# Patient Record
Sex: FEMALE | Race: ASIAN | NOT HISPANIC OR LATINO | Employment: FULL TIME | URBAN - METROPOLITAN AREA
[De-identification: names, ages, dates, MRNs, and addresses within clinical notes are randomized per-mention and may not be internally consistent; named-entity substitution may affect disease eponyms.]

---

## 2019-09-09 ENCOUNTER — TRANSCRIBE ORDERS (OUTPATIENT)
Dept: LAB | Facility: HOSPITAL | Age: 39
End: 2019-09-09

## 2019-09-09 ENCOUNTER — LAB (OUTPATIENT)
Dept: LAB | Facility: HOSPITAL | Age: 39
End: 2019-09-09

## 2019-09-09 DIAGNOSIS — Z32.01 PREGNANCY EXAMINATION OR TEST, POSITIVE RESULT: ICD-10-CM

## 2019-09-09 DIAGNOSIS — Z32.01 PREGNANCY EXAMINATION OR TEST, POSITIVE RESULT: Primary | ICD-10-CM

## 2019-09-09 LAB
PROGEST SERPL-MCNC: 24.1 NG/ML
T4 FREE SERPL-MCNC: 1.45 NG/DL (ref 0.93–1.7)
TSH SERPL DL<=0.05 MIU/L-ACNC: 0.94 UIU/ML (ref 0.27–4.2)

## 2019-09-09 PROCEDURE — 84443 ASSAY THYROID STIM HORMONE: CPT

## 2019-09-09 PROCEDURE — 84702 CHORIONIC GONADOTROPIN TEST: CPT

## 2019-09-09 PROCEDURE — 36415 COLL VENOUS BLD VENIPUNCTURE: CPT

## 2019-09-09 PROCEDURE — 84144 ASSAY OF PROGESTERONE: CPT

## 2019-09-09 PROCEDURE — 84439 ASSAY OF FREE THYROXINE: CPT

## 2019-09-10 LAB — HCG INTACT+B SERPL-ACNC: NORMAL MIU/ML

## 2019-10-22 ENCOUNTER — LAB REQUISITION (OUTPATIENT)
Dept: LAB | Facility: HOSPITAL | Age: 39
End: 2019-10-22

## 2019-10-22 ENCOUNTER — LAB (OUTPATIENT)
Dept: LAB | Facility: HOSPITAL | Age: 39
End: 2019-10-22

## 2019-10-22 DIAGNOSIS — Z00.00 ROUTINE GENERAL MEDICAL EXAMINATION AT A HEALTH CARE FACILITY: ICD-10-CM

## 2019-10-22 DIAGNOSIS — Z34.81 PRENATAL CARE, SUBSEQUENT PREGNANCY, FIRST TRIMESTER: Primary | ICD-10-CM

## 2019-10-22 LAB
ABO GROUP BLD: NORMAL
BLD GP AB SCN SERPL QL: NEGATIVE
GLUCOSE BLD-MCNC: 127 MG/DL (ref 65–99)
HBV SURFACE AG SERPL QL IA: NORMAL
HBV SURFACE AG SERPL QL IA: NORMAL
HCV AB SER DONR QL: NORMAL
HIV1+2 AB SER QL: NORMAL
RH BLD: POSITIVE

## 2019-10-22 PROCEDURE — 82947 ASSAY GLUCOSE BLOOD QUANT: CPT

## 2019-10-22 PROCEDURE — 87340 HEPATITIS B SURFACE AG IA: CPT

## 2019-10-22 PROCEDURE — 86803 HEPATITIS C AB TEST: CPT

## 2019-10-22 PROCEDURE — 86901 BLOOD TYPING SEROLOGIC RH(D): CPT

## 2019-10-22 PROCEDURE — 86900 BLOOD TYPING SEROLOGIC ABO: CPT

## 2019-10-22 PROCEDURE — 80081 OBSTETRIC PANEL INC HIV TSTG: CPT

## 2019-10-22 PROCEDURE — 36415 COLL VENOUS BLD VENIPUNCTURE: CPT

## 2019-10-22 PROCEDURE — 86850 RBC ANTIBODY SCREEN: CPT

## 2019-10-23 LAB
BASOPHILS # BLD AUTO: 0.03 10*3/MM3 (ref 0–0.2)
BASOPHILS NFR BLD AUTO: 0.3 % (ref 0–1.5)
DEPRECATED RDW RBC AUTO: 41 FL (ref 37–54)
EOSINOPHIL # BLD AUTO: 0.09 10*3/MM3 (ref 0–0.4)
EOSINOPHIL NFR BLD AUTO: 1 % (ref 0.3–6.2)
ERYTHROCYTE [DISTWIDTH] IN BLOOD BY AUTOMATED COUNT: 12.5 % (ref 12.3–15.4)
HCT VFR BLD AUTO: 32.4 % (ref 34–46.6)
HGB BLD-MCNC: 11.1 G/DL (ref 12–15.9)
IMM GRANULOCYTES # BLD AUTO: 0.04 10*3/MM3 (ref 0–0.05)
IMM GRANULOCYTES NFR BLD AUTO: 0.4 % (ref 0–0.5)
LYMPHOCYTES # BLD AUTO: 2.75 10*3/MM3 (ref 0.7–3.1)
LYMPHOCYTES NFR BLD AUTO: 29.2 % (ref 19.6–45.3)
MCH RBC QN AUTO: 31.1 PG (ref 26.6–33)
MCHC RBC AUTO-ENTMCNC: 34.3 G/DL (ref 31.5–35.7)
MCV RBC AUTO: 90.8 FL (ref 79–97)
MONOCYTES # BLD AUTO: 0.48 10*3/MM3 (ref 0.1–0.9)
MONOCYTES NFR BLD AUTO: 5.1 % (ref 5–12)
NEUTROPHILS # BLD AUTO: 6.03 10*3/MM3 (ref 1.7–7)
NEUTROPHILS NFR BLD AUTO: 64 % (ref 42.7–76)
NRBC BLD AUTO-RTO: 0 /100 WBC (ref 0–0.2)
PLATELET # BLD AUTO: 292 10*3/MM3 (ref 140–450)
PMV BLD AUTO: 9.8 FL (ref 6–12)
RBC # BLD AUTO: 3.57 10*6/MM3 (ref 3.77–5.28)
RPR SER QL: NORMAL
RUBV IGG SERPL IA-ACNC: NORMAL
WBC NRBC COR # BLD: 9.42 10*3/MM3 (ref 3.4–10.8)

## 2020-02-11 ENCOUNTER — LAB (OUTPATIENT)
Dept: LAB | Facility: HOSPITAL | Age: 40
End: 2020-02-11

## 2020-02-11 ENCOUNTER — TRANSCRIBE ORDERS (OUTPATIENT)
Dept: LAB | Facility: HOSPITAL | Age: 40
End: 2020-02-11

## 2020-02-11 DIAGNOSIS — Z34.83 PRENATAL CARE, SUBSEQUENT PREGNANCY, THIRD TRIMESTER: Primary | ICD-10-CM

## 2020-02-11 DIAGNOSIS — Z34.83 PRENATAL CARE, SUBSEQUENT PREGNANCY, THIRD TRIMESTER: ICD-10-CM

## 2020-02-11 DIAGNOSIS — Z3A.28 28 WEEKS GESTATION OF PREGNANCY: ICD-10-CM

## 2020-02-11 LAB
BLD GP AB SCN SERPL QL: NEGATIVE
DEPRECATED RDW RBC AUTO: 40.2 FL (ref 37–54)
ERYTHROCYTE [DISTWIDTH] IN BLOOD BY AUTOMATED COUNT: 11.9 % (ref 12.3–15.4)
GLUCOSE 1H P 100 G GLC PO SERPL-MCNC: 89 MG/DL (ref 65–140)
HCT VFR BLD AUTO: 32.5 % (ref 34–46.6)
HGB BLD-MCNC: 11.1 G/DL (ref 12–15.9)
MCH RBC QN AUTO: 31.9 PG (ref 26.6–33)
MCHC RBC AUTO-ENTMCNC: 34.2 G/DL (ref 31.5–35.7)
MCV RBC AUTO: 93.4 FL (ref 79–97)
PLATELET # BLD AUTO: 260 10*3/MM3 (ref 140–450)
PMV BLD AUTO: 9.6 FL (ref 6–12)
RBC # BLD AUTO: 3.48 10*6/MM3 (ref 3.77–5.28)
WBC NRBC COR # BLD: 7.85 10*3/MM3 (ref 3.4–10.8)

## 2020-02-11 PROCEDURE — 86850 RBC ANTIBODY SCREEN: CPT

## 2020-02-11 PROCEDURE — 82950 GLUCOSE TEST: CPT

## 2020-02-11 PROCEDURE — 85027 COMPLETE CBC AUTOMATED: CPT

## 2020-02-11 PROCEDURE — 36415 COLL VENOUS BLD VENIPUNCTURE: CPT

## 2020-04-08 LAB — EXTERNAL GROUP B STREP ANTIGEN: NORMAL

## 2020-04-22 ENCOUNTER — HOSPITAL ENCOUNTER (INPATIENT)
Facility: HOSPITAL | Age: 40
LOS: 2 days | Discharge: HOME | End: 2020-04-24
Attending: OBSTETRICS & GYNECOLOGY | Admitting: OBSTETRICS & GYNECOLOGY

## 2020-04-22 ENCOUNTER — ANESTHESIA EVENT (OUTPATIENT)
Dept: LABOR AND DELIVERY | Facility: HOSPITAL | Age: 40
End: 2020-04-22

## 2020-04-22 ENCOUNTER — ANESTHESIA (OUTPATIENT)
Dept: LABOR AND DELIVERY | Facility: HOSPITAL | Age: 40
End: 2020-04-22

## 2020-04-22 LAB
ABO GROUP BLD: NORMAL
AMPHET+METHAMPHET UR QL: NEGATIVE
AMPHETAMINES UR QL: NEGATIVE
BARBITURATES UR QL SCN: NEGATIVE
BENZODIAZ UR QL SCN: NEGATIVE
BLD GP AB SCN SERPL QL: NEGATIVE
BUPRENORPHINE SERPL-MCNC: NEGATIVE NG/ML
CANNABINOIDS SERPL QL: NEGATIVE
COCAINE UR QL: NEGATIVE
DEPRECATED RDW RBC AUTO: 47.8 FL (ref 37–54)
ERYTHROCYTE [DISTWIDTH] IN BLOOD BY AUTOMATED COUNT: 13.2 % (ref 12.3–15.4)
HCT VFR BLD AUTO: 38.1 % (ref 34–46.6)
HGB BLD-MCNC: 12.2 G/DL (ref 12–15.9)
MCH RBC QN AUTO: 31 PG (ref 26.6–33)
MCHC RBC AUTO-ENTMCNC: 32 G/DL (ref 31.5–35.7)
MCV RBC AUTO: 96.9 FL (ref 79–97)
METHADONE UR QL SCN: NEGATIVE
OPIATES UR QL: NEGATIVE
OXYCODONE UR QL SCN: NEGATIVE
PCP UR QL SCN: NEGATIVE
PLATELET # BLD AUTO: 213 10*3/MM3 (ref 140–450)
PMV BLD AUTO: 9.8 FL (ref 6–12)
PROPOXYPH UR QL: NEGATIVE
RBC # BLD AUTO: 3.93 10*6/MM3 (ref 3.77–5.28)
RH BLD: POSITIVE
T&S EXPIRATION DATE: NORMAL
TRICYCLICS UR QL SCN: NEGATIVE
WBC NRBC COR # BLD: 8.19 10*3/MM3 (ref 3.4–10.8)

## 2020-04-22 PROCEDURE — 0KQM0ZZ REPAIR PERINEUM MUSCLE, OPEN APPROACH: ICD-10-PCS | Performed by: NURSE PRACTITIONER

## 2020-04-22 PROCEDURE — 25010000002 PENICILLIN G POTASSIUM PER 600000 UNITS: Performed by: OBSTETRICS & GYNECOLOGY

## 2020-04-22 PROCEDURE — 59025 FETAL NON-STRESS TEST: CPT

## 2020-04-22 PROCEDURE — 86850 RBC ANTIBODY SCREEN: CPT | Performed by: OBSTETRICS & GYNECOLOGY

## 2020-04-22 PROCEDURE — C1755 CATHETER, INTRASPINAL: HCPCS | Performed by: ANESTHESIOLOGY

## 2020-04-22 PROCEDURE — 86901 BLOOD TYPING SEROLOGIC RH(D): CPT | Performed by: OBSTETRICS & GYNECOLOGY

## 2020-04-22 PROCEDURE — 80306 DRUG TEST PRSMV INSTRMNT: CPT | Performed by: NURSE PRACTITIONER

## 2020-04-22 PROCEDURE — 86900 BLOOD TYPING SEROLOGIC ABO: CPT | Performed by: OBSTETRICS & GYNECOLOGY

## 2020-04-22 PROCEDURE — 25010000002 ROPIVACAINE PER 1 MG: Performed by: ANESTHESIOLOGY

## 2020-04-22 PROCEDURE — 85027 COMPLETE CBC AUTOMATED: CPT | Performed by: OBSTETRICS & GYNECOLOGY

## 2020-04-22 PROCEDURE — 25010000002 FENTANYL CITRATE (PF) 100 MCG/2ML SOLUTION: Performed by: ANESTHESIOLOGY

## 2020-04-22 RX ORDER — HYDROCORTISONE 25 MG/G
1 CREAM TOPICAL AS NEEDED
Status: DISCONTINUED | OUTPATIENT
Start: 2020-04-22 | End: 2020-04-24 | Stop reason: HOSPADM

## 2020-04-22 RX ORDER — PROMETHAZINE HYDROCHLORIDE 25 MG/ML
12.5 INJECTION, SOLUTION INTRAMUSCULAR; INTRAVENOUS EVERY 6 HOURS PRN
Status: DISCONTINUED | OUTPATIENT
Start: 2020-04-22 | End: 2020-04-22 | Stop reason: HOSPADM

## 2020-04-22 RX ORDER — DOCUSATE SODIUM 100 MG/1
100 CAPSULE, LIQUID FILLED ORAL 2 TIMES DAILY
Status: DISCONTINUED | OUTPATIENT
Start: 2020-04-22 | End: 2020-04-24 | Stop reason: HOSPADM

## 2020-04-22 RX ORDER — ACETAMINOPHEN 325 MG/1
650 TABLET ORAL EVERY 4 HOURS PRN
Status: DISCONTINUED | OUTPATIENT
Start: 2020-04-22 | End: 2020-04-22 | Stop reason: HOSPADM

## 2020-04-22 RX ORDER — ONDANSETRON 2 MG/ML
4 INJECTION INTRAMUSCULAR; INTRAVENOUS EVERY 6 HOURS PRN
Status: DISCONTINUED | OUTPATIENT
Start: 2020-04-22 | End: 2020-04-22 | Stop reason: HOSPADM

## 2020-04-22 RX ORDER — IBUPROFEN 600 MG/1
600 TABLET ORAL EVERY 6 HOURS PRN
Status: DISCONTINUED | OUTPATIENT
Start: 2020-04-22 | End: 2020-04-22 | Stop reason: HOSPADM

## 2020-04-22 RX ORDER — OXYTOCIN-SODIUM CHLORIDE 0.9% IV SOLN 30 UNIT/500ML 30-0.9/5 UT/ML-%
650 SOLUTION INTRAVENOUS ONCE
Status: COMPLETED | OUTPATIENT
Start: 2020-04-22 | End: 2020-04-22

## 2020-04-22 RX ORDER — PROMETHAZINE HYDROCHLORIDE 12.5 MG/1
12.5 TABLET ORAL EVERY 6 HOURS PRN
Status: DISCONTINUED | OUTPATIENT
Start: 2020-04-22 | End: 2020-04-22 | Stop reason: HOSPADM

## 2020-04-22 RX ORDER — PROMETHAZINE HYDROCHLORIDE 12.5 MG/1
12.5 SUPPOSITORY RECTAL EVERY 6 HOURS PRN
Status: DISCONTINUED | OUTPATIENT
Start: 2020-04-22 | End: 2020-04-22 | Stop reason: HOSPADM

## 2020-04-22 RX ORDER — MAGNESIUM CARB/ALUMINUM HYDROX 105-160MG
30 TABLET,CHEWABLE ORAL ONCE
Status: DISCONTINUED | OUTPATIENT
Start: 2020-04-22 | End: 2020-04-22 | Stop reason: HOSPADM

## 2020-04-22 RX ORDER — EPHEDRINE SULFATE/0.9% NACL/PF 25 MG/5 ML
10 SYRINGE (ML) INTRAVENOUS
Status: DISCONTINUED | OUTPATIENT
Start: 2020-04-22 | End: 2020-04-22 | Stop reason: HOSPADM

## 2020-04-22 RX ORDER — MORPHINE SULFATE 2 MG/ML
2 INJECTION, SOLUTION INTRAMUSCULAR; INTRAVENOUS
Status: DISCONTINUED | OUTPATIENT
Start: 2020-04-22 | End: 2020-04-22 | Stop reason: HOSPADM

## 2020-04-22 RX ORDER — OXYTOCIN-SODIUM CHLORIDE 0.9% IV SOLN 30 UNIT/500ML 30-0.9/5 UT/ML-%
85 SOLUTION INTRAVENOUS ONCE
Status: COMPLETED | OUTPATIENT
Start: 2020-04-22 | End: 2020-04-22

## 2020-04-22 RX ORDER — IBUPROFEN 600 MG/1
600 TABLET ORAL EVERY 6 HOURS PRN
Status: DISCONTINUED | OUTPATIENT
Start: 2020-04-22 | End: 2020-04-24 | Stop reason: HOSPADM

## 2020-04-22 RX ORDER — LANOLIN
CREAM (ML) TOPICAL
Status: DISCONTINUED | OUTPATIENT
Start: 2020-04-22 | End: 2020-04-24 | Stop reason: HOSPADM

## 2020-04-22 RX ORDER — METOCLOPRAMIDE HYDROCHLORIDE 5 MG/ML
10 INJECTION INTRAMUSCULAR; INTRAVENOUS ONCE AS NEEDED
Status: DISCONTINUED | OUTPATIENT
Start: 2020-04-22 | End: 2020-04-22 | Stop reason: HOSPADM

## 2020-04-22 RX ORDER — SODIUM CHLORIDE 0.9 % (FLUSH) 0.9 %
1-10 SYRINGE (ML) INJECTION AS NEEDED
Status: DISCONTINUED | OUTPATIENT
Start: 2020-04-22 | End: 2020-04-24 | Stop reason: HOSPADM

## 2020-04-22 RX ORDER — PENICILLIN G 3000000 [IU]/50ML
3 INJECTION, SOLUTION INTRAVENOUS EVERY 4 HOURS
Status: DISCONTINUED | OUTPATIENT
Start: 2020-04-22 | End: 2020-04-22 | Stop reason: HOSPADM

## 2020-04-22 RX ORDER — ROPIVACAINE HYDROCHLORIDE 2 MG/ML
15 INJECTION, SOLUTION EPIDURAL; INFILTRATION; PERINEURAL CONTINUOUS
Status: DISCONTINUED | OUTPATIENT
Start: 2020-04-22 | End: 2020-04-24 | Stop reason: HOSPADM

## 2020-04-22 RX ORDER — SODIUM CHLORIDE, SODIUM LACTATE, POTASSIUM CHLORIDE, CALCIUM CHLORIDE 600; 310; 30; 20 MG/100ML; MG/100ML; MG/100ML; MG/100ML
125 INJECTION, SOLUTION INTRAVENOUS CONTINUOUS
Status: DISCONTINUED | OUTPATIENT
Start: 2020-04-22 | End: 2020-04-24 | Stop reason: HOSPADM

## 2020-04-22 RX ORDER — FENTANYL CITRATE 50 UG/ML
INJECTION, SOLUTION INTRAMUSCULAR; INTRAVENOUS AS NEEDED
Status: DISCONTINUED | OUTPATIENT
Start: 2020-04-22 | End: 2020-04-22 | Stop reason: SURG

## 2020-04-22 RX ORDER — DIPHENHYDRAMINE HYDROCHLORIDE 50 MG/ML
12.5 INJECTION INTRAMUSCULAR; INTRAVENOUS EVERY 8 HOURS PRN
Status: DISCONTINUED | OUTPATIENT
Start: 2020-04-22 | End: 2020-04-22 | Stop reason: HOSPADM

## 2020-04-22 RX ORDER — SODIUM CHLORIDE 0.9 % (FLUSH) 0.9 %
3 SYRINGE (ML) INJECTION EVERY 12 HOURS SCHEDULED
Status: DISCONTINUED | OUTPATIENT
Start: 2020-04-22 | End: 2020-04-22 | Stop reason: HOSPADM

## 2020-04-22 RX ORDER — LIDOCAINE HYDROCHLORIDE 10 MG/ML
5 INJECTION, SOLUTION EPIDURAL; INFILTRATION; INTRACAUDAL; PERINEURAL AS NEEDED
Status: DISCONTINUED | OUTPATIENT
Start: 2020-04-22 | End: 2020-04-22 | Stop reason: HOSPADM

## 2020-04-22 RX ORDER — ONDANSETRON 2 MG/ML
4 INJECTION INTRAMUSCULAR; INTRAVENOUS EVERY 6 HOURS PRN
Status: DISCONTINUED | OUTPATIENT
Start: 2020-04-22 | End: 2020-04-24 | Stop reason: HOSPADM

## 2020-04-22 RX ORDER — ONDANSETRON 4 MG/1
4 TABLET, FILM COATED ORAL EVERY 6 HOURS PRN
Status: DISCONTINUED | OUTPATIENT
Start: 2020-04-22 | End: 2020-04-22 | Stop reason: HOSPADM

## 2020-04-22 RX ORDER — MORPHINE SULFATE 2 MG/ML
2 INJECTION, SOLUTION INTRAMUSCULAR; INTRAVENOUS
Status: DISCONTINUED | OUTPATIENT
Start: 2020-04-22 | End: 2020-04-22 | Stop reason: SDUPTHER

## 2020-04-22 RX ORDER — LIDOCAINE HYDROCHLORIDE AND EPINEPHRINE 15; 5 MG/ML; UG/ML
INJECTION, SOLUTION EPIDURAL AS NEEDED
Status: DISCONTINUED | OUTPATIENT
Start: 2020-04-22 | End: 2020-04-22 | Stop reason: SURG

## 2020-04-22 RX ORDER — OXYCODONE HYDROCHLORIDE AND ACETAMINOPHEN 5; 325 MG/1; MG/1
2 TABLET ORAL EVERY 4 HOURS PRN
Status: DISCONTINUED | OUTPATIENT
Start: 2020-04-22 | End: 2020-04-22 | Stop reason: HOSPADM

## 2020-04-22 RX ORDER — ONDANSETRON 2 MG/ML
4 INJECTION INTRAMUSCULAR; INTRAVENOUS ONCE AS NEEDED
Status: DISCONTINUED | OUTPATIENT
Start: 2020-04-22 | End: 2020-04-22 | Stop reason: HOSPADM

## 2020-04-22 RX ORDER — FAMOTIDINE 10 MG/ML
20 INJECTION, SOLUTION INTRAVENOUS ONCE AS NEEDED
Status: DISCONTINUED | OUTPATIENT
Start: 2020-04-22 | End: 2020-04-22 | Stop reason: HOSPADM

## 2020-04-22 RX ORDER — TRISODIUM CITRATE DIHYDRATE AND CITRIC ACID MONOHYDRATE 500; 334 MG/5ML; MG/5ML
30 SOLUTION ORAL ONCE
Status: DISCONTINUED | OUTPATIENT
Start: 2020-04-22 | End: 2020-04-22 | Stop reason: HOSPADM

## 2020-04-22 RX ORDER — SODIUM CHLORIDE 0.9 % (FLUSH) 0.9 %
10 SYRINGE (ML) INJECTION AS NEEDED
Status: DISCONTINUED | OUTPATIENT
Start: 2020-04-22 | End: 2020-04-22 | Stop reason: HOSPADM

## 2020-04-22 RX ADMIN — ROPIVACAINE HYDROCHLORIDE 11 ML: 5 INJECTION, SOLUTION EPIDURAL; INFILTRATION; PERINEURAL at 05:39

## 2020-04-22 RX ADMIN — BENZOCAINE 1 APPLICATION: 5.6 OINTMENT TOPICAL at 14:25

## 2020-04-22 RX ADMIN — ROPIVACAINE HYDROCHLORIDE 15 ML/HR: 2 INJECTION, SOLUTION EPIDURAL; INFILTRATION at 05:44

## 2020-04-22 RX ADMIN — LIDOCAINE HYDROCHLORIDE AND EPINEPHRINE 2 ML: 15; 5 INJECTION, SOLUTION EPIDURAL at 05:38

## 2020-04-22 RX ADMIN — IBUPROFEN 600 MG: 600 TABLET ORAL at 14:29

## 2020-04-22 RX ADMIN — HYDROCORTISONE 2.5% 1 APPLICATION: 25 CREAM TOPICAL at 14:25

## 2020-04-22 RX ADMIN — Medication: at 14:26

## 2020-04-22 RX ADMIN — OXYTOCIN 650 ML/HR: 10 INJECTION, SOLUTION INTRAMUSCULAR; INTRAVENOUS at 09:03

## 2020-04-22 RX ADMIN — PENICILLIN G 3 MILLION UNITS: 3000000 INJECTION, SOLUTION INTRAVENOUS at 08:49

## 2020-04-22 RX ADMIN — OXYTOCIN 85 ML/HR: 10 INJECTION, SOLUTION INTRAMUSCULAR; INTRAVENOUS at 09:18

## 2020-04-22 RX ADMIN — Medication 10 MG: at 05:58

## 2020-04-22 RX ADMIN — LIDOCAINE HYDROCHLORIDE AND EPINEPHRINE 3 ML: 15; 5 INJECTION, SOLUTION EPIDURAL at 05:36

## 2020-04-22 RX ADMIN — SODIUM CHLORIDE 5 MILLION UNITS: 900 INJECTION INTRAVENOUS at 04:42

## 2020-04-22 RX ADMIN — Medication: at 14:25

## 2020-04-22 RX ADMIN — SODIUM CHLORIDE, POTASSIUM CHLORIDE, SODIUM LACTATE AND CALCIUM CHLORIDE 125 ML/HR: 600; 310; 30; 20 INJECTION, SOLUTION INTRAVENOUS at 04:42

## 2020-04-22 RX ADMIN — DOCUSATE SODIUM 100 MG: 100 CAPSULE, LIQUID FILLED ORAL at 14:26

## 2020-04-22 RX ADMIN — FENTANYL CITRATE 100 MCG: 50 INJECTION, SOLUTION INTRAMUSCULAR; INTRAVENOUS at 05:42

## 2020-04-22 RX ADMIN — WITCH HAZEL 1 PAD: 500 SOLUTION RECTAL; TOPICAL at 14:25

## 2020-04-22 RX ADMIN — Medication 10 MG: at 06:19

## 2020-04-22 NOTE — L&D DELIVERY NOTE
[facility]  Vaginal Delivery Note    Delivery     Delivery: Vaginal, Spontaneous  Z3A.38   YOB: 2018    Time of Birth: 8:59 AM      Anesthesia: Epidural     Delivering clinician: Becky Blackman    Forceps?   No   Vacuum? No    Shoulder dystocia present: No        Delivery narrative:  Patient pushed to deliver a female infant over a second degree laceration in OA position. Spontaneous cry noted. Infant was placed on maternal abdomen and dried. The cord was doubly clamped and cut, The placenta delivered spontaneously and visually intact.  The perineum and vagina were inspected for lacerations. A second degree laceration was repaired with 2-0 and 3-0 rapide.  All counts were correct. Mom and baby entered recovery in stable condition.     Infant    Findings: female  infant, Mena     Infant observations: Weight: 3230 g (7 lb 1.9 oz)   Length: 20  in  Observations/Comments:         Apgars: 9   @ 1 minute /    9   @ 5 minutes         Placenta, Cord, and Fluid    Placenta delivered  Spontaneous  at  4/22/2020  9:03 AM     Cord: 3 vessels  present.   Nuchal Cord?  no   Cord blood obtained: Yes    Cord gases obtained:  No    Cord gas results: Venous:  No components found for:  PHCVEN,  BECVEN     Arterial:  No components found for:  PHCART,  BECART      Repair    Episiotomy: No   Lacerations: Yes  Laceration Information  Laceration Repaired?   Perineal: 2nd  Yes    Periurethral:         Labial:         Sulcus:         Vaginal:         Cervical:              Estimated Blood Loss: 250  mls.   Suture used for repair: 2-0 and 3-0 rapide         Complications  none    Disposition  Mother to Mother Baby/Postpartum  in stable condition currently.  Baby to remains with mom  in stable condition currently.      Becky Blackman CNM  04/22/20  10:23

## 2020-04-22 NOTE — NURSING NOTE
Pt transferred to MB via wheelchair in stable condition with spouse and baby with the assistance of one RN. Bedside report given to MB RN and baby dropped off in nursery in stable condition to Nursery RN.

## 2020-04-22 NOTE — PROGRESS NOTES
Resting comfortably with epidural in place  Denies any pressure with contractions  fhr cat 1  Continue expectant management given gbs positive status  Plan arom after second dose of antibiotics

## 2020-04-22 NOTE — ANESTHESIA PROCEDURE NOTES
Labor Epidural      Patient reassessed immediately prior to procedure    Patient location during procedure: OB  Performed By  Anesthesiologist: Olena Alegria DO  Preanesthetic Checklist  Completed: patient identified, surgical consent, pre-op evaluation, timeout performed, IV checked, risks and benefits discussed and monitors and equipment checked  Additional Notes  DPE performed using 25g Kimberly  Prep:  Pt Position:sitting  Sterile Tech:cap, gloves, mask and sterile barrier  Prep:DuraPrep  Monitoring:blood pressure monitoring  Epidural Block Procedure:  Approach:midline  Guidance:palpation technique  Location:L3-L4  Needle Type:Tuohy  Needle Gauge:17 G  Loss of Resistance Medium: air  Loss of Resistance: 4cm  Cath Depth at skin:11 cm  Paresthesia: none  Aspiration:negative  Test Dose:negative  Number of Attempts: 1  Post Assessment:  Dressing:occlusive dressing applied and secured with tape  Pt Tolerance:patient tolerated the procedure well with no apparent complications  Complications:no

## 2020-04-22 NOTE — PLAN OF CARE
Problem: Patient Care Overview  Goal: Plan of Care Review  Outcome: Ongoing (interventions implemented as appropriate)  Flowsheets (Taken 4/22/2020 1023)  Progress: improving  Goal: Individualization and Mutuality  Outcome: Ongoing (interventions implemented as appropriate)  Flowsheets (Taken 4/22/2020 1023)  Patient Specific Goals (Include Timeframe): To have happy healthy baby girl before lunch time  Patient Specific Interventions: Breast and bottlefeed  Patient Specific Preferences: Epidural  Goal: Discharge Needs Assessment  Outcome: Ongoing (interventions implemented as appropriate)  Goal: Interprofessional Rounds/Family Conf  Outcome: Ongoing (interventions implemented as appropriate)     Problem: Labor (Cervical Ripen, Induct, Augment) (Adult,Obstetrics,Pediatric)  Goal: Signs and Symptoms of Listed Potential Problems Will be Absent, Minimized or Managed (Labor)  Outcome: Ongoing (interventions implemented as appropriate)  Flowsheets (Taken 4/22/2020 1023)  Problems Assessed (Labor): all  Problems Present (Labor): none

## 2020-04-22 NOTE — H&P
[facility]  Obstetric History and Physical    Chief Complaint   Patient presents with   • Contractions       Subjective     Patient is a 39 y.o. female  currently at 38w6d, who presents with regular, painful uterine contractions which started about 4 hours ago. She reports good fetal movement. She denies vaginal bleeding, has had spotting since cervical exam in triage, denies loss of fluid.     Her prenatal care is benign.  Her previous obstetric/gynecological history is noted for previous full term vaginal delivery.    The following portions of the patients history were reviewed and updated as appropriate: current medications, allergies, past medical history, past surgical history, past family history, past social history and problem list .       Prenatal Information:  Prenatal Results     POC Urine Glucose/Protein     Test Value Reference Range Date Time    Urine Glucose        Urine Protein              Initial Prenatal Labs     Test Value Reference Range Date Time    Hemoglobin 11.1 g/dL 12.0 - 15.9 10/22/19 1624    Hematocrit 32.4 % 34.0 - 46.6 10/22/19 1624    Platelets 260 10*3/mm3 140 - 450 20 1125      292 10*3/mm3 140 - 450 10/22/19 1624    Rubella IgG Equivocal   10/22/19 1624    Hepatitis B SAg Non-Reactive  Non-Reactive 10/22/19 1624      Non-Reactive  Non-Reactive 10/22/19 1624    Hepatitis C Ab Non-Reactive  Non-Reactive 10/22/19 1624    RPR Non-Reactive  Non-Reactive 10/22/19 1624    ABO B   10/22/19 1624    Rh Positive   10/22/19 1624    Antibody Screen Negative   10/22/19 1624    HIV Non-Reactive  Non-Reactive 10/22/19 1624    Urine Culture        Gonorrhea        Chlamydia        TSH 0.936 uIU/mL 0.270 - 4.200 19 1634          2nd and 3rd Trimester     Test Value Reference Range Date Time    Hemoglobin (repeated) 11.1 g/dL 12.0 - 15.9 20 1125    Hematocrit (repeated) 32.5 % 34.0 - 46.6 20 1125    GCT 89 mg/dL 65 - 140 20 1125    Antibody Screen (repeated)  Negative   02/11/20 1125    GTT Fasting 97 mg/dL  03/18/16     GTT 1 Hr        GTT 2 Hr        GTT 3 Hr        Group B Strep              Drug Screening     Test Value Reference Range Date Time    Amphetamine Screen        Barbiturate Screen        Benzodiazepine Screen        Methadone Screen        Phencyclidine Screen        Opiates Screen        THC Screen        Cocaine Screen        Propoxyphene Screen        Buprenorphine Screen        Methamphetamine Screen        Oxycodone Screen        Tricyclic Antidepressants Screen              Other (Risk screening)     Test Value Reference Range Date Time    Varicella IgG        Parvovirus IgG        CMV IgG        Cystic Fibrosis        Hemoglobin electrophoresis        NIPT        MSAFP-4        AFP (for NTD only)                  External Prenatal Results     Pregnancy Outside Results - Transcribed From Office Records - See Scanned Records For Details     Test Value Date Time    Hgb 11.1 g/dL 02/11/20 1125      11.1 g/dL 10/22/19 1624    Hct 32.5 % 02/11/20 1125      32.4 % 10/22/19 1624    ABO B  10/22/19 1624    Rh Positive  10/22/19 1624    Antibody Screen Negative  02/11/20 1125      Negative  10/22/19 1624    Glucose Fasting GTT 97 mg/dL 03/18/16     Glucose Tolerance Test 1 hour       Glucose Tolerance Test 3 hour       Gonorrhea (discrete)       Chlamydia (discrete)       RPR Non-Reactive  10/22/19 1624    VDRL       Syphilis Antibody       Rubella Equivocal  10/22/19 1624    HBsAg Non-Reactive  10/22/19 1624      Non-Reactive  10/22/19 1624    Herpes Simplex Virus PCR       Herpes Simplex VIrus Culture       HIV Non-Reactive  10/22/19 1624    Hep C RNA Quant PCR       Hep C Antibody Non-Reactive  10/22/19 1624    AFP       Group B Strep NEG  03/04/16     GBS Susceptibility to Clindamycin       GBS Susceptibility to Erythromycin       Fetal Fibronectin       Genetic Testing, Maternal Blood             Drug Screening     Test Value Date Time    Urine Drug  Screen Negative  16     Amphetamine Screen Negative ng/mL 16 1043    Barbiturate Screen Negative ng/mL 16 1043    Benzodiazepine Screen Negative ng/mL 16 1043    Methadone Screen Negative ng/mL 16 1043    Phencyclidine Screen Negative ng/mL 16 1043    Opiates Screen       THC Screen       Cocaine Screen       Propoxyphene Screen Negative ng/mL 16 1043    Buprenorphine Screen Negative ng/mL 16 1043    Methamphetamine Screen       Oxycodone Screen Negative ng/mL 16 1043    Tricyclic Antidepressants Screen                    Past OB History:     OB History    Para Term  AB Living   2 1 1 0 0 0   SAB TAB Ectopic Molar Multiple Live Births   0 0 0 0 0 0      # Outcome Date GA Lbr Abdoul/2nd Weight Sex Delivery Anes PTL Lv   2 Current            1 Term 16 38w6d  2835 g (6 lb 4 oz) M Vag-Spont EPI N        Past Medical History: History reviewed. No pertinent past medical history.   Past Surgical History Past Surgical History:   Procedure Laterality Date   • WISDOM TOOTH EXTRACTION        Family History: History reviewed. No pertinent family history.   Social History:  reports that she has never smoked. She does not have any smokeless tobacco history on file.   reports that she does not drink alcohol.   reports that she does not use drugs.        Review of Systems   All other systems reviewed and are negative.        Objective       Physical Examination: General appearance - alert, well appearing, and in no distress  Chest - clear to auscultation, no wheezes, rales or rhonchi, symmetric air entry  Heart - normal rate, regular rhythm, normal S1, S2, no murmurs, rubs, clicks or gallops  Abdomen - soft, nontender, nondistended, no masses or organomegaly, gravid  Neurological - alert, oriented, normal speech, no focal findings or movement disorder noted  Musculoskeletal - no joint tenderness, deformity or swelling  Extremities - peripheral pulses normal, no  pedal edema, no clubbing or cyanosis  Skin - normal coloration and turgor, no rashes, no suspicious skin lesions noted    Presentation:    Cervix: Exam by:  rn   Dilation: Cervical Dilation (cm): 8-9   Effacement: Cervical Effacement: 100%   Station:       Fetal Heart Rate Assessment   Method:  external   Beats/min:  145   Baseline:  140   Variability:  moderate   Accels:  present   Decels:  absent   Tracing Category:  cat 1     Uterine Assessment   Method:  toco   Frequency (min):  4-5 mins   Ctx Count in 10 min:     Duration:     Intensity:     Intensity by IUPC:     Resting Tone:     Resting Tone by IUPC:     [place] Units:           Assessment/Plan       Normal labor      Assessment & Plan    Assessment:  1.  Intrauterine pregnancy at 38w6d gestation with reactive, reassuring fetal status.    2.  labor  without ROM  3.  Obstetrical history significant for prior full term vaginal delivery.  4.  GBS status: positive    Plan:  1. fetal and uterine monitoring  continuously, expectant management, analgesia with  parenteral narcotics and epidural and antibiotic for GBS  2. Plan of care has been reviewed with patient   3.  Risks, benefits of treatment plan have been discussed.  4.  All questions have been answered.        Hilary Cuevas CNM  4/22/2020  04:33

## 2020-04-23 LAB
BASOPHILS # BLD AUTO: 0.04 10*3/MM3 (ref 0–0.2)
BASOPHILS NFR BLD AUTO: 0.3 % (ref 0–1.5)
DEPRECATED RDW RBC AUTO: 48 FL (ref 37–54)
EOSINOPHIL # BLD AUTO: 0.12 10*3/MM3 (ref 0–0.4)
EOSINOPHIL NFR BLD AUTO: 1 % (ref 0.3–6.2)
ERYTHROCYTE [DISTWIDTH] IN BLOOD BY AUTOMATED COUNT: 13.5 % (ref 12.3–15.4)
HCT VFR BLD AUTO: 35.2 % (ref 34–46.6)
HGB BLD-MCNC: 11.5 G/DL (ref 12–15.9)
IMM GRANULOCYTES # BLD AUTO: 0.06 10*3/MM3 (ref 0–0.05)
IMM GRANULOCYTES NFR BLD AUTO: 0.5 % (ref 0–0.5)
LYMPHOCYTES # BLD AUTO: 2.58 10*3/MM3 (ref 0.7–3.1)
LYMPHOCYTES NFR BLD AUTO: 21.7 % (ref 19.6–45.3)
MCH RBC QN AUTO: 31.9 PG (ref 26.6–33)
MCHC RBC AUTO-ENTMCNC: 32.7 G/DL (ref 31.5–35.7)
MCV RBC AUTO: 97.5 FL (ref 79–97)
MONOCYTES # BLD AUTO: 0.58 10*3/MM3 (ref 0.1–0.9)
MONOCYTES NFR BLD AUTO: 4.9 % (ref 5–12)
NEUTROPHILS # BLD AUTO: 8.5 10*3/MM3 (ref 1.7–7)
NEUTROPHILS NFR BLD AUTO: 71.6 % (ref 42.7–76)
NRBC BLD AUTO-RTO: 0 /100 WBC (ref 0–0.2)
PLATELET # BLD AUTO: 218 10*3/MM3 (ref 140–450)
PMV BLD AUTO: 10 FL (ref 6–12)
RBC # BLD AUTO: 3.61 10*6/MM3 (ref 3.77–5.28)
WBC NRBC COR # BLD: 11.88 10*3/MM3 (ref 3.4–10.8)

## 2020-04-23 PROCEDURE — 85025 COMPLETE CBC W/AUTO DIFF WBC: CPT | Performed by: NURSE PRACTITIONER

## 2020-04-23 RX ORDER — IBUPROFEN 600 MG/1
600 TABLET ORAL EVERY 6 HOURS PRN
Start: 2020-04-23

## 2020-04-23 RX ADMIN — IBUPROFEN 600 MG: 600 TABLET ORAL at 03:27

## 2020-04-23 RX ADMIN — DOCUSATE SODIUM 100 MG: 100 CAPSULE, LIQUID FILLED ORAL at 20:07

## 2020-04-23 RX ADMIN — DOCUSATE SODIUM 100 MG: 100 CAPSULE, LIQUID FILLED ORAL at 07:46

## 2020-04-23 RX ADMIN — IBUPROFEN 600 MG: 600 TABLET ORAL at 21:59

## 2020-04-23 NOTE — ANESTHESIA POSTPROCEDURE EVALUATION
Patient: Estela Josue    Procedure Summary     Date:  04/22/20 Room / Location:      Anesthesia Start:  0529 Anesthesia Stop:  0903    Procedure:  LABOR ANALGESIA Diagnosis:      Scheduled Providers:   Provider:  Kan Singh DO    Anesthesia Type:  epidural ASA Status:  2          Anesthesia Type: epidural    Vitals  Vitals Value Taken Time   BP 97/57 4/23/2020  7:00 AM   Temp 98 °F (36.7 °C) 4/23/2020  7:00 AM   Pulse 64 4/23/2020  7:00 AM   Resp 16 4/23/2020  7:00 AM   SpO2             Post Anesthesia Care and Evaluation    Patient location during evaluation: bedside  Patient participation: complete - patient participated  Level of consciousness: awake and alert  Pain management: adequate  Airway patency: patent  Anesthetic complications: No anesthetic complications    Cardiovascular status: acceptable  Respiratory status: acceptable  Hydration status: acceptable  Post Neuraxial Block status: Motor and sensory function returned to baseline and No signs or symptoms of PDPH

## 2020-04-23 NOTE — PROGRESS NOTES
4/23/2020  PPD #1    Subjective   Estela feels well.  Patient describes her lochia as less than menses.  Pain is well controlled  She is breastfeeding and supplementing as needed.   She desires discharge today.     Objective   Temp: Temp:  [97.8 °F (36.6 °C)-98.9 °F (37.2 °C)] 98 °F (36.7 °C) Temp src: Oral   BP: BP: ()/(50-62) 97/57        Pulse: Heart Rate:  [60-81] 64  RR: Resp:  [16-18] 16    General:  No acute distress   Abdomen: Fundus firm and beneath umbilicus   Pelvis: deferred     Lab Results   Component Value Date    WBC 8.19 04/22/2020    HGB 12.2 04/22/2020    HCT 38.1 04/22/2020    MCV 96.9 04/22/2020     04/22/2020    ABORH B Rh Positive 03/16/2016    RUBELLAIGGIN Immune 03/16/2016    HEPBSAG Non-Reactive 10/22/2019    HEPBSAG Non-Reactive 10/22/2019       Assessment  1. PPD# 1 after vaginal delivery    Plan  1. Discharge to home  2. Follow up with [facility]  in 6 weeks  3. Motrin use discussed and advised on weaning.  4. Advised no tampons, intercourse, or tub baths for 6 weeks.       This note has been electronically signed.    Kun Gonzalez CNM  08:37  April 23, 2020

## 2020-04-23 NOTE — DISCHARGE SUMMARY
[facility]  Vaginal delivery discharge summary      Patient: Estela Josue      MR#:[id number]  Admission  Diagnosis:   Patient Active Problem List   Diagnosis   •  (normal spontaneous vaginal delivery)     Discharge Diagnosis:  (normal spontaneous vaginal delivery)      Date of Admission: 2020  Date of Discharge:  2020    Procedures:  Vaginal, Spontaneous     2020    8:59 AM      Service:  Obstetrics    Hospital Course:  Patient underwent vaginal delivery and remained in the hospital for 1 days.  During that time she remained afebrile and hemodynamically stable.  On the day of discharge, she was eating, ambulating and voiding without difficulty.  She is breastfeeding and supplementing as needed.     Labs:    Lab Results   Component Value Date    WBC 8.19 2020    HGB 12.2 2020    HCT 38.1 2020    MCV 96.9 2020     2020     Results from last 7 days   Lab Units 20  0447   ABO TYPING  B   RH TYPING  Positive   ANTIBODY SCREEN  Negative       Discharge Medications     Discharge Medications      New Medications      Instructions Start Date   ibuprofen 600 MG tablet  Commonly known as:  ADVIL,MOTRIN   600 mg, Oral, Every 6 Hours PRN         Continue These Medications      Instructions Start Date   Prenatal 27-1 27-1 MG tablet tablet   1 tablet, Oral, Daily             Discharge Disposition:  To Home    Discharge Condition:  Stable    Discharge Diet: Regular    Activity at Discharge: Pelvic rest    Follow-up Appointments  Follow up with [facility] in 6 weeks.     Kun Gonzalez CNM  20  08:36

## 2020-04-24 VITALS
HEART RATE: 61 BPM | DIASTOLIC BLOOD PRESSURE: 57 MMHG | HEIGHT: 63 IN | SYSTOLIC BLOOD PRESSURE: 98 MMHG | WEIGHT: 147 LBS | TEMPERATURE: 98.7 F | BODY MASS INDEX: 26.05 KG/M2 | RESPIRATION RATE: 18 BRPM

## 2020-04-24 RX ADMIN — DOCUSATE SODIUM 100 MG: 100 CAPSULE, LIQUID FILLED ORAL at 07:36

## 2020-04-24 NOTE — PROGRESS NOTES
[facility]  Vaginal Delivery Progress Note    Subjective     Doing well, pain controlled, lochia less than menses      Objective     Vital Signs Range for the last 24 hours  Temperature: Temp:  [98 °F (36.7 °C)-98.7 °F (37.1 °C)] 98.7 °F (37.1 °C)   Temp Source: Temp src: Oral   BP: BP: ()/(57-68) 98/57   Pulse: Heart Rate:  [61-71] 61   Respirations: Resp:  [16-18] 18   SPO2:     O2 Amount (l/min):     O2 Devices           Physical Exam:  General:  no acute distresss.  Abdomen: Soft, non-tender, fundus firm  Extremities: normal, atraumatic, no cyanosis, and trace edema.       Lab results reviewed:  Yes    Lab Results   Component Value Date    WBC 11.88 (H) 2020    HGB 11.5 (L) 2020    HCT 35.2 2020    MCV 97.5 (H) 2020     2020         Assessment/Plan        (normal spontaneous vaginal delivery)      Estela Josue is Day 2  post-partum       Plan:  Discharge home with standard precautions and return to clinic in 4-6 weeks.      Hilary Cuevas CNM  2020  08:32

## 2020-04-24 NOTE — PLAN OF CARE
Problem: Breastfeeding (Adult,Obstetrics,Pediatric)  Goal: Signs and Symptoms of Listed Potential Problems Will be Absent, Minimized or Managed (Breastfeeding)  Outcome: Ongoing (interventions implemented as appropriate)  Flowsheets (Taken 4/24/2020 1010)  Problems Assessed (Breastfeeding): ineffective breastfeeding; pain; situational response; skin breakdown  Intervention: Provide Support During Feeding Sessions  Flowsheets (Taken 4/24/2020 1010)  Parent/Child Attachment Promotion: caring behavior modeled; cue recognition promoted; skin-to-skin contact encouraged; participation in care promoted; strengths emphasized; positive reinforcement provided  Intervention: Promote Positive Maternal Experience  Flowsheets (Taken 4/24/2020 1010)  Breastfeeding Support: diary/feeding log utilized; encouragement provided; lactation counseling provided  Intervention: Support Exclusive Breastfeeding Success  Flowsheets (Taken 4/24/2020 1010)  Breastfeeding Assistance: support offered; feeding cue recognition promoted; feeding on demand promoted  Note:   Decreased milk supply with first baby. Discussed pumping after feedings.

## 2020-04-24 NOTE — PLAN OF CARE
Lochia wnl, vs stable, ready for d/c  Problem: Patient Care Overview  Goal: Plan of Care Review  Outcome: Outcome(s) achieved  Flowsheets  Taken 4/22/2020 1023 by Fatmata Ray, RN  Progress: improving  Taken 4/24/2020 0809 by Mayte Newell RN  Plan of Care Reviewed With: patient  Goal: Individualization and Mutuality  Outcome: Outcome(s) achieved  Goal: Discharge Needs Assessment  Outcome: Outcome(s) achieved  Goal: Interprofessional Rounds/Family Conf  Outcome: Outcome(s) achieved     Problem: Postpartum (Vaginal Delivery) (Adult,Obstetrics,Pediatric)  Goal: Signs and Symptoms of Listed Potential Problems Will be Absent, Minimized or Managed (Postpartum)  Outcome: Outcome(s) achieved

## 2020-04-24 NOTE — PLAN OF CARE
Problem: Patient Care Overview  Goal: Plan of Care Review  Outcome: Ongoing (interventions implemented as appropriate)  Flowsheets (Taken 4/24/2020 1021)  Plan of Care Reviewed With: patient  Outcome Summary: pt did well during the night. slight pain. very light bleeding. mom  all night with no issues.

## 2020-04-24 NOTE — DISCHARGE SUMMARY
[facility]  Delivery Discharge Summary    Primary OB Clinician:     EDC: Estimated Date of Delivery: 20    Gestational Age:38w6d    Date of Admission: 2020    Admission Diagnosis:  labor    Discharge Diagnosis: Same,     Date of Delivery: 2020   Time of Delivery: 8:59 AM     Delivered By:  Becky Blackman     Delivery Type: Vaginal, Spontaneous          Baby:female  infant;   Apgar:  9   @ 1 minute /   Apgar:  9   @ 5 minutes   Weight: 3230 g (7 lb 1.9 oz)    Length: 20     Anesthesia: Epidural      Laceration: Yes  Laceration Information  Laceration Repaired?   Perineal: 2nd  Yes    Periurethral:         Labial:         Sulcus:         Vaginal:         Cervical:               Exam:  Normal postpartum exam    Hospital Course:  Hospital course has been stable.  Patient is tolerating po, voiding without difficulty and ready for discharge.  Please see medication reconciliation and lab report for additional details.      Follow Up:  Patient has been given a follow up appointment in our office in 6 weeks. Postpartum lochia, mastitis, and depression warning signs reviewed. Pelvic rest for 6 weeks.     Discharge Date: 2020; Discharge Time: 08:35

## 2020-04-24 NOTE — PROGRESS NOTES
4/24/2020  PPD #2    Subjective   Estela feels well.  Patient describes her lochia as less than menses.  Pain is well controlled       Objective   Temp: Temp:  [98 °F (36.7 °C)-98.5 °F (36.9 °C)] 98 °F (36.7 °C) Temp src: Oral   BP: BP: (101)/(59-68) 101/68        Pulse: Heart Rate:  [64-71] 64  RR: Resp:  [16-18] 16    General:  No acute distress   Abdomen: Fundus firm and beneath umbilicus   Pelvis: deferred     Lab Results   Component Value Date    WBC 11.88 (H) 04/23/2020    HGB 11.5 (L) 04/23/2020    HCT 35.2 04/23/2020    MCV 97.5 (H) 04/23/2020     04/23/2020    ABORH B Rh Positive 03/16/2016    RUBELLAIGGIN Immune 03/16/2016    HEPBSAG Non-Reactive 10/22/2019    HEPBSAG Non-Reactive 10/22/2019       Assessment  1. PPD# 2 after vaginal delivery    Plan  1. Discharge to home  2. Follow up with [facility]  in 6 weeks  3. Prescriptions for Motrin and Percocet prescribed and advised on weaning.  4. Advised no tampons, intercourse, or tub baths for 6 weeks.       This note has been electronically signed.    Valeria Rowe MD  07:33  April 24, 2020

## 2020-04-24 NOTE — LACTATION NOTE
04/24/20 1010   Maternal Information   Date of Referral 04/24/20   Person Making Referral nurse;patient   Maternal Reason for Referral milk supply inadequate history   Infant Reason for Referral   (reports baby fussy at breast)   Equipment Type   Breast Pump Type double electric, personal  (pump at home--knows how to use)   Reproductive Interventions   Breastfeeding Assistance support offered;feeding cue recognition promoted;feeding on demand promoted   Breastfeeding Support diary/feeding log utilized;encouragement provided;lactation counseling provided   Breast Pumping   Breast Pumping Interventions post-feed pumping encouraged   Coping/Psychosocial Interventions   Parent/Child Attachment Promotion caring behavior modeled;cue recognition promoted;skin-to-skin contact encouraged;participation in care promoted;strengths emphasized;positive reinforcement provided   Discussed pumping after feedings to get best milk supply possible. Because mom is concerned about milk supply, discussed feeding every 3 hours--breastfeed/pump/supplement. Teaching done, as documented under Education. To call lactation services, if there are questions or concerns or if mom wants outpatient clinic appt.

## 2025-07-11 ENCOUNTER — TELEPHONE (OUTPATIENT)
Dept: SURGERY | Facility: CLINIC | Age: 45
End: 2025-07-11
Payer: COMMERCIAL

## 2025-07-11 NOTE — TELEPHONE ENCOUNTER
PATIENT RETURNED CALL AND SAID SHE WANTED TO SEE A SURGEON IN [place]. ADVISED PATIENT TO CONTACT HER REFERRING PROVIDER. CALLED REFERRING OFFICE AND MONIE